# Patient Record
Sex: MALE | Race: WHITE | Employment: OTHER | URBAN - METROPOLITAN AREA
[De-identification: names, ages, dates, MRNs, and addresses within clinical notes are randomized per-mention and may not be internally consistent; named-entity substitution may affect disease eponyms.]

---

## 2022-09-18 ENCOUNTER — APPOINTMENT (OUTPATIENT)
Dept: MRI IMAGING | Age: 72
End: 2022-09-18
Payer: COMMERCIAL

## 2022-09-18 ENCOUNTER — HOSPITAL ENCOUNTER (EMERGENCY)
Age: 72
Discharge: LEFT BEFORE DISCHARGE | End: 2022-09-18
Attending: EMERGENCY MEDICINE
Payer: COMMERCIAL

## 2022-09-18 ENCOUNTER — APPOINTMENT (OUTPATIENT)
Dept: CT IMAGING | Age: 72
End: 2022-09-18
Payer: COMMERCIAL

## 2022-09-18 ENCOUNTER — APPOINTMENT (OUTPATIENT)
Dept: GENERAL RADIOLOGY | Age: 72
End: 2022-09-18
Payer: COMMERCIAL

## 2022-09-18 VITALS
SYSTOLIC BLOOD PRESSURE: 136 MMHG | HEART RATE: 73 BPM | OXYGEN SATURATION: 97 % | DIASTOLIC BLOOD PRESSURE: 67 MMHG | TEMPERATURE: 97.8 F | RESPIRATION RATE: 12 BRPM | HEIGHT: 66 IN | BODY MASS INDEX: 29.73 KG/M2 | WEIGHT: 185 LBS

## 2022-09-18 DIAGNOSIS — Z53.29 LEFT AGAINST MEDICAL ADVICE: ICD-10-CM

## 2022-09-18 DIAGNOSIS — I70.90 ATHEROSCLEROSIS: ICD-10-CM

## 2022-09-18 DIAGNOSIS — R42 DIZZINESS: Primary | ICD-10-CM

## 2022-09-18 LAB
A/G RATIO: 1.6 (ref 1.1–2.2)
ALBUMIN SERPL-MCNC: 4.2 G/DL (ref 3.4–5)
ALP BLD-CCNC: 62 U/L (ref 40–129)
ALT SERPL-CCNC: 14 U/L (ref 10–40)
ANION GAP SERPL CALCULATED.3IONS-SCNC: 9 MMOL/L (ref 3–16)
AST SERPL-CCNC: 26 U/L (ref 15–37)
BASOPHILS ABSOLUTE: 0.1 K/UL (ref 0–0.2)
BASOPHILS RELATIVE PERCENT: 1.2 %
BILIRUB SERPL-MCNC: <0.2 MG/DL (ref 0–1)
BILIRUBIN URINE: NEGATIVE
BLOOD, URINE: ABNORMAL
BUN BLDV-MCNC: 20 MG/DL (ref 7–20)
CALCIUM SERPL-MCNC: 8.6 MG/DL (ref 8.3–10.6)
CHLORIDE BLD-SCNC: 104 MMOL/L (ref 99–110)
CHP ED QC CHECK: YES
CLARITY: CLEAR
CO2: 22 MMOL/L (ref 21–32)
COLOR: YELLOW
CREAT SERPL-MCNC: 1 MG/DL (ref 0.8–1.3)
EKG ATRIAL RATE: 66 BPM
EKG DIAGNOSIS: NORMAL
EKG P AXIS: 71 DEGREES
EKG P-R INTERVAL: 164 MS
EKG Q-T INTERVAL: 402 MS
EKG QRS DURATION: 82 MS
EKG QTC CALCULATION (BAZETT): 421 MS
EKG R AXIS: 63 DEGREES
EKG T AXIS: 42 DEGREES
EKG VENTRICULAR RATE: 66 BPM
EOSINOPHILS ABSOLUTE: 0.4 K/UL (ref 0–0.6)
EOSINOPHILS RELATIVE PERCENT: 4.8 %
GFR AFRICAN AMERICAN: >60
GFR NON-AFRICAN AMERICAN: >60
GLUCOSE BLD-MCNC: 125 MG/DL
GLUCOSE BLD-MCNC: 125 MG/DL (ref 70–99)
GLUCOSE BLD-MCNC: 134 MG/DL (ref 70–99)
GLUCOSE URINE: NEGATIVE MG/DL
HCT VFR BLD CALC: 46.5 % (ref 40.5–52.5)
HEMOGLOBIN: 15.4 G/DL (ref 13.5–17.5)
INR BLD: 0.9 (ref 0.87–1.14)
KETONES, URINE: NEGATIVE MG/DL
LEUKOCYTE ESTERASE, URINE: NEGATIVE
LYMPHOCYTES ABSOLUTE: 2.4 K/UL (ref 1–5.1)
LYMPHOCYTES RELATIVE PERCENT: 29.7 %
MAGNESIUM: 1.9 MG/DL (ref 1.8–2.4)
MCH RBC QN AUTO: 32.1 PG (ref 26–34)
MCHC RBC AUTO-ENTMCNC: 33.3 G/DL (ref 31–36)
MCV RBC AUTO: 96.6 FL (ref 80–100)
MICROSCOPIC EXAMINATION: YES
MONOCYTES ABSOLUTE: 0.7 K/UL (ref 0–1.3)
MONOCYTES RELATIVE PERCENT: 9 %
NEUTROPHILS ABSOLUTE: 4.4 K/UL (ref 1.7–7.7)
NEUTROPHILS RELATIVE PERCENT: 55.3 %
NITRITE, URINE: NEGATIVE
PDW BLD-RTO: 14.3 % (ref 12.4–15.4)
PERFORMED ON: ABNORMAL
PH UA: 6.5 (ref 5–8)
PLATELET # BLD: 178 K/UL (ref 135–450)
PLATELET SLIDE REVIEW: ADEQUATE
PMV BLD AUTO: 9.7 FL (ref 5–10.5)
POTASSIUM SERPL-SCNC: 5 MMOL/L (ref 3.5–5.1)
PROTEIN UA: NEGATIVE MG/DL
PROTHROMBIN TIME: 11.9 SEC (ref 11.7–14.5)
RBC # BLD: 4.81 M/UL (ref 4.2–5.9)
RBC # BLD: NORMAL 10*6/UL
RBC UA: NORMAL /HPF (ref 0–4)
SLIDE REVIEW: NORMAL
SODIUM BLD-SCNC: 135 MMOL/L (ref 136–145)
SPECIFIC GRAVITY UA: 1.01 (ref 1–1.03)
TOTAL PROTEIN: 6.9 G/DL (ref 6.4–8.2)
TROPONIN: <0.01 NG/ML
URINE REFLEX TO CULTURE: ABNORMAL
URINE TYPE: ABNORMAL
UROBILINOGEN, URINE: 0.2 E.U./DL
WBC # BLD: 8 K/UL (ref 4–11)
WBC UA: NORMAL /HPF (ref 0–5)

## 2022-09-18 PROCEDURE — 70551 MRI BRAIN STEM W/O DYE: CPT

## 2022-09-18 PROCEDURE — 71045 X-RAY EXAM CHEST 1 VIEW: CPT

## 2022-09-18 PROCEDURE — 70450 CT HEAD/BRAIN W/O DYE: CPT

## 2022-09-18 PROCEDURE — 6360000004 HC RX CONTRAST MEDICATION: Performed by: EMERGENCY MEDICINE

## 2022-09-18 PROCEDURE — 6370000000 HC RX 637 (ALT 250 FOR IP): Performed by: EMERGENCY MEDICINE

## 2022-09-18 PROCEDURE — 83735 ASSAY OF MAGNESIUM: CPT

## 2022-09-18 PROCEDURE — 6360000002 HC RX W HCPCS: Performed by: EMERGENCY MEDICINE

## 2022-09-18 PROCEDURE — 84484 ASSAY OF TROPONIN QUANT: CPT

## 2022-09-18 PROCEDURE — 93005 ELECTROCARDIOGRAM TRACING: CPT | Performed by: EMERGENCY MEDICINE

## 2022-09-18 PROCEDURE — 81001 URINALYSIS AUTO W/SCOPE: CPT

## 2022-09-18 PROCEDURE — 80053 COMPREHEN METABOLIC PANEL: CPT

## 2022-09-18 PROCEDURE — 93010 ELECTROCARDIOGRAM REPORT: CPT | Performed by: INTERNAL MEDICINE

## 2022-09-18 PROCEDURE — 70498 CT ANGIOGRAPHY NECK: CPT

## 2022-09-18 PROCEDURE — 96374 THER/PROPH/DIAG INJ IV PUSH: CPT

## 2022-09-18 PROCEDURE — 85610 PROTHROMBIN TIME: CPT

## 2022-09-18 PROCEDURE — 99285 EMERGENCY DEPT VISIT HI MDM: CPT

## 2022-09-18 PROCEDURE — 85025 COMPLETE CBC W/AUTO DIFF WBC: CPT

## 2022-09-18 PROCEDURE — 2580000003 HC RX 258: Performed by: EMERGENCY MEDICINE

## 2022-09-18 RX ORDER — ASPIRIN 81 MG/1
81 TABLET, CHEWABLE ORAL DAILY
Qty: 30 TABLET | Refills: 0 | Status: SHIPPED | OUTPATIENT
Start: 2022-09-18 | End: 2022-10-18

## 2022-09-18 RX ORDER — ONDANSETRON 2 MG/ML
4 INJECTION INTRAMUSCULAR; INTRAVENOUS ONCE
Status: COMPLETED | OUTPATIENT
Start: 2022-09-18 | End: 2022-09-18

## 2022-09-18 RX ORDER — 0.9 % SODIUM CHLORIDE 0.9 %
500 INTRAVENOUS SOLUTION INTRAVENOUS ONCE
Status: COMPLETED | OUTPATIENT
Start: 2022-09-18 | End: 2022-09-18

## 2022-09-18 RX ORDER — DIAZEPAM 2 MG/1
2 TABLET ORAL EVERY 12 HOURS PRN
Qty: 5 TABLET | Refills: 0 | Status: SHIPPED | OUTPATIENT
Start: 2022-09-18 | End: 2022-09-28

## 2022-09-18 RX ORDER — DIAZEPAM 5 MG/1
5 TABLET ORAL ONCE
Status: COMPLETED | OUTPATIENT
Start: 2022-09-18 | End: 2022-09-18

## 2022-09-18 RX ORDER — MECLIZINE HCL 12.5 MG/1
25 TABLET ORAL ONCE
Status: COMPLETED | OUTPATIENT
Start: 2022-09-18 | End: 2022-09-18

## 2022-09-18 RX ORDER — MECLIZINE HCL 12.5 MG/1
12.5 TABLET ORAL 3 TIMES DAILY PRN
Qty: 15 TABLET | Refills: 0 | Status: SHIPPED | OUTPATIENT
Start: 2022-09-18 | End: 2022-09-28

## 2022-09-18 RX ORDER — ONDANSETRON 4 MG/1
4 TABLET, ORALLY DISINTEGRATING ORAL EVERY 8 HOURS PRN
Qty: 20 TABLET | Refills: 0 | Status: SHIPPED | OUTPATIENT
Start: 2022-09-18

## 2022-09-18 RX ADMIN — SODIUM CHLORIDE 500 ML: 9 INJECTION, SOLUTION INTRAVENOUS at 09:23

## 2022-09-18 RX ADMIN — ONDANSETRON 4 MG: 2 INJECTION INTRAMUSCULAR; INTRAVENOUS at 09:18

## 2022-09-18 RX ADMIN — MECLIZINE 25 MG: 12.5 TABLET ORAL at 10:11

## 2022-09-18 RX ADMIN — IOPAMIDOL 75 ML: 755 INJECTION, SOLUTION INTRAVENOUS at 08:56

## 2022-09-18 RX ADMIN — DIAZEPAM 5 MG: 5 TABLET ORAL at 10:42

## 2022-09-18 ASSESSMENT — ENCOUNTER SYMPTOMS
SINUS PAIN: 0
VOMITING: 0
COLOR CHANGE: 0
SORE THROAT: 0
SHORTNESS OF BREATH: 0
TROUBLE SWALLOWING: 0
EYE PAIN: 0
CHEST TIGHTNESS: 0
NAUSEA: 1
ABDOMINAL PAIN: 0
PHOTOPHOBIA: 0
EYE REDNESS: 0

## 2022-09-18 ASSESSMENT — PAIN - FUNCTIONAL ASSESSMENT: PAIN_FUNCTIONAL_ASSESSMENT: NONE - DENIES PAIN

## 2022-09-18 NOTE — CONSULTS
Telemedicine Consult Note   Stroke Team                Patient Name: Diane Solo (52 y.o. male)  MRN: [id number]  : 1950  Admission Date: 2022   Current Date: 22    STROKE TIMELINE  Last Known to be Well (Stroke Diagnosis)  Date Last Known Well: 22  Time Last Known Well: 0100  ED arrival time: ED Arrival 0829    TENECTEPLASE (TNKase) bolus time: None  DTN (minutes): NA    Groin puncture time: NA  ED arrival to groin puncture time: NA      Chief Complaint     Chief Complaint   Patient presents with    Dizziness     Patient states \"everything is spinning\" since I woke up this AM around 0800. LNW 1am this morning. Patient states \"whooshing noise\" in right ear. Acute onset of vertigo and unsteadiness. History of Present Illness   This is a 67 y.o., male, who has no prior symptoms of vertigo or hearing problems presented with acute onset of vertigo and unsteadiness. He has had recurrent R ear pulsatile tinnitus. Patient's last known normal was 1 AM. . Past Medical History:   Diagnosis Date    Hypertension       No past surgical history on file.   Social History     Socioeconomic History    Marital status:      Spouse name: Not on file    Number of children: Not on file    Years of education: Not on file    Highest education level: Not on file   Occupational History    Not on file   Tobacco Use    Smoking status: Every Day     Packs/day: 1.00     Types: Cigarettes    Smokeless tobacco: Never   Vaping Use    Vaping Use: Never used   Substance and Sexual Activity    Alcohol use: Yes     Comment: couple times per month    Drug use: Never    Sexual activity: Not on file   Other Topics Concern    Not on file   Social History Narrative    Not on file     Social Determinants of Health     Financial Resource Strain: Not on file   Food Insecurity: Not on file   Transportation Needs: Not on file   Physical Activity: Not on file   Stress: Not on file   Social Connections: Not on file   Intimate Partner Violence: Not on file   Housing Stability: Not on file     No family history on file. No current facility-administered medications on file prior to encounter. Current Outpatient Medications on File Prior to Encounter   Medication Sig Dispense Refill    aspirin 325 MG tablet Take 325 mg by mouth daily. Review of Systems         Physical Exam     Vitals:    22 0955   BP: (!) 146/73   Pulse: 63   Resp: 12   Temp:    SpO2: 100%              NIH Stroke Scale    Time Performed: 10:14 AM        1a  Level of consciousness: 0 - alert; keenly responsive   1b. LOC questions:  0 - answers both questions correctly   1c. LOC commands: 0=Performs both tasks correctly   2. Best Gaze: 0=normal   3. Visual: 0=No visual loss   4. Facial Palsy: 0=Normal symmetric movement   5a. Motor left arm: 0=No drift, limb holds 90 (or 45) degrees for full 10 seconds   5b. Motor right arm: 0=No drift, limb holds 90 (or 45) degrees for full 10 seconds   6a. motor left le=No drift, limb holds 90 (or 45) degrees for full 10 seconds   6b  Motor right le=No drift, limb holds 90 (or 45) degrees for full 10 seconds   7. Limb Ataxia: 0=Absent   8. Sensory: 0=Normal; no sensory loss   9. Best Language:  0 - no aphasia, normal   10. Dysarthria: 0=Normal   11. Extinction and Inattention: 0=No abnormality         Total:   0     Other exam findings of note: nystagmus per ED doc hard to see on video        Labs:  CBC:   Recent Labs     22  0845   WBC 8.0   HGB 15.4        BMP:    Recent Labs     22  0845   *   K 5.0      CO2 22   BUN 20   CREATININE 1.0   GLUCOSE 134*     Ca/Mg/Phos:   Recent Labs     22  0845   CALCIUM 8.6   MG 1.90     Troponin:   Recent Labs     22  0845   TROPONINI <0.01     BNP: No results for input(s): BNP in the last 72 hours. Lipids: No results for input(s): CHOL, TRIG, HDL, LDLCALC, LABVLDL in the last 72 hours.   ABGs: No results for input(s): PHART, PO2ART, NYQ3GDQ in the last 72 hours. PT/INR:   Recent Labs     09/18/22  0845   PROTIME 11.9   INR 0.90     APTT: No results for input(s): APTT in the last 72 hours. HgA1C: Invalid input(s): HGBA1C    Radiology (my read): Normal CT and mastoid cells aerated but couldn't see L SCA and smaller L vertebral ends in PICA. CT HEAD WO CONTRAST    Result Date: 9/18/2022  No acute intracranial abnormality. Findings were discussed with Jaylyn Laureano at 8:57 am on 9/18/2022. CTA HEAD NECK W CONTRAST    Result Date: 9/18/2022  1. No large vessel occlusion, significant stenosis or cerebral aneurysm identified within the brain. 2. Mild atherosclerosis without significant arterial stenosis identified within the neck. MRI of brain:   no acute infarcts on diffusion. Mild white matter changes on flair in posterior white matter. Assessment   1) Acute vertigo that is likely end organ in origin without evidence of ischemic change on MRI at this point. No history of Meniere's. Could have viral vestibular syndrome as well. Not BPV. I wonder about pulsatile tinnitus - ? Dural fistula but nothing clearly seen on CTA. Recommendations/Plan   1) Admit to hospital for symptomatic treatment and then when steadier evaluate for stability before going home. Monitor for any changes. Tenecteplase given: No  Not a stroke at this point. If DTN >60 minutes, reason: NA    EVT:No  Transfer: No     Patient enrolled in clinical trials: No  Clinical trial   Please do not change any trial related orders without speaking with the research coordinator first.  The number to contact is [phone]. If tenecteplase given, keep blood pressure <180/105; otherwise permissive hypertension. If tenecteplase given, do NOT give antiplatelets or anticoagulants until 24 hours and a safety scan has been completed.      Additional Recommendations:   -NPO until passing bedside dysphagia screen or formal swallow  -MRI/CT at 24 hours as a safety scan  -Local Neurology consult  -PT/OT/ST     For a change in neuro exam or questions, call the  Stroke Team at [phone].           Electronically signed by Renetta Richter MD on 9/18/22 at 10:14 AM EDT    Stroke Team        Telemedicine Time: 30   minutes

## 2022-09-18 NOTE — ED NOTES
Patient reports no dizziness after receiving valium. Alert and oriented resting in bed.       Lesly Yun RN  09/18/22 5014

## 2022-09-18 NOTE — DISCHARGE INSTRUCTIONS
Use meclizine as needed for dizziness. Stay hydrated. Take Valium for breakthrough dizziness but do not drive with this and understand it can be addictive. Take Zofran for nausea. Take a baby aspirin daily until told otherwise by your primary doctor. Return to the emergency department over the next 12 to 24 hours for any worsening dizziness with inability to walk, severe headache, new onset chest pain with shortness of breath, or any other concerns. We did recommend admission today but at this time you are declining. Make sure to follow-up with your primary doctor and that within the next 1 to 3 days for repeat evaluation to ensure you are still doing well.

## 2022-09-18 NOTE — ED NOTES
Code Stroke   @0839  Called Code Stroke  @9304 Called  Stroke Team  @3703 Called CT  @6177 CT ready for PT  @0840 Called Lab   @0381 Ember Nesbitt called back from [facility] Stroke Team and spoke to [address] [place] [address]

## 2022-09-18 NOTE — ED NOTES
Patient ambulated in hallway with steady gate. Reported no dizziness and that he would like to be discharged.       Mary Kate Cheek RN  09/18/22 4304

## 2022-09-18 NOTE — ED NOTES
Patient ambulated with supervised assistance by RN from Room 10 to bathroom across from Room 9 and back. Patient reporting continued dizziness, worse when moving head from side to side. Patient gait unsteady during ambulation. Dr. Aristeo Suero observed portions of the ambulation and also updated post ambulation.       Jennifer Rosado RN  09/18/22 7930

## 2022-09-18 NOTE — ED NOTES
Patient back from MRI.  Stroke Team contacted and will TeleStroke with patient.       Daniela Alfaro RN  09/18/22 5546

## 2022-09-18 NOTE — ED NOTES
RN spoke with  Stroke Team and they requested that patient have MRI prior to speaking with them. Stroke physician also okayed holding meclizine at this time prior to assessment. MRI questionnaire completed; MRI contacted, inpatient coming to department and will call for ER patient as soon as completed.       Garrick Brownlee RN  09/18/22 9245

## 2023-01-01 NOTE — ED PROVIDER NOTES
[facility]  ED  EMERGENCY DEPARTMENT ENCOUNTER        Pt Name: Marcella Thompson  MRN: [id number]  [place] 1950  Date of evaluation: 9/18/2022  Provider: Micaela Roper MD  PCP: Ric Moyer MD      [address] [place] [address]       Chief Complaint   Patient presents with    Dizziness     Patient states \"everything is spinning\" since I woke up this AM around 0800. LNW 1am this morning. Patient states \"whooshing noise\" in right ear. HISTORY OFPRESENT ILLNESS   (Location/Symptom, Timing/Onset, Context/Setting, Quality, Duration, Modifying Factors,Severity)  Note limiting factors. Marcella Thompson is a 67 y.o. male presenting today due to concern for waking up around 8 AM this morning with obvious dizziness with the association that the room is spinning. His wife had to help him significantly to get from the bed to the car to come to the emergency department and he was unable to walk on his own. He does report an intermittent whooshing sound in his right ear. He reports some neck discomfort but that is a chronic issue. He denies any recent neck manipulations. He denies any chest pain or shortness of breath. He denies any numbness or weakness in the arms or legs. No visual concerns other than that the room is spinning. No trouble swallowing. No trouble speaking. He is nauseated but denies any vomiting. He felt fine when he went to bed at 1 AM last night and did not wake up throughout the night from what he can remember and the moment he woke up this morning is when he noticed the vertigo. He denies having a history of symptoms like this in the past.  He denies any headache. He did take some aspirin this morning prior to coming to the emergency department but denies being on any other blood thinners. He does not take anything for blood pressure normally.         REVIEW OF SYSTEMS    (2-9 systems for level 4, 10 or more for level 5)     Review of Systems   Constitutional: Negative for chills, diaphoresis, fatigue and fever. HENT:  Negative for dental problem, ear pain, hearing loss (whooshing sound in right ear), sinus pain, sore throat and trouble swallowing. Eyes:  Positive for visual disturbance (related to room spinning only). Negative for photophobia, pain and redness. Respiratory:  Negative for chest tightness and shortness of breath. Cardiovascular:  Negative for chest pain. Gastrointestinal:  Positive for nausea. Negative for abdominal pain and vomiting. Genitourinary:  Negative for flank pain. Musculoskeletal:  Positive for gait problem and neck pain. Negative for neck stiffness. Skin:  Negative for color change and wound. Neurological:  Positive for dizziness. Negative for seizures, syncope, speech difficulty, weakness, light-headedness, numbness and headaches. Hematological:  Does not bruise/bleed easily. Psychiatric/Behavioral:  Negative for confusion. The patient is nervous/anxious. Positives and Pertinent negatives as per HPI. PASTMEDICAL HISTORY     Past Medical History:   Diagnosis Date    Hypertension          SURGICAL HISTORY     No past surgical history on file. CURRENT MEDICATIONS       Discharge Medication List as of 9/18/2022 12:47 PM        CONTINUE these medications which have NOT CHANGED    Details   aspirin 325 MG tablet Take 325 mg by mouth daily. ALLERGIES     Patient has no known allergies. FAMILY HISTORY     No family history on file.        SOCIAL HISTORY       Social History     Socioeconomic History    Marital status:    Tobacco Use    Smoking status: Every Day     Packs/day: 1.00     Types: Cigarettes    Smokeless tobacco: Never   Vaping Use    Vaping Use: Never used   Substance and Sexual Activity    Alcohol use: Yes     Comment: couple times per month    Drug use: Never       SCREENINGS   NIH Stroke Scale  Interval: Reassessment  Level of Consciousness (1a): Alert  LOC Questions (1b): Answers both correctly  LOC Commands (1c): Performs both tasks correctly  Best Gaze (2): Normal  Visual (3): No visual loss  Facial Palsy (4): Normal symmetrical movement  Motor Arm, Left (5a): No drift  Motor Arm, Right (5b): No drift  Motor Leg, Left (6a): No drift  Motor Leg, Right (6b): No drift  Limb Ataxia (7): Absent  Sensory (8): Normal  Best Language (9): No aphasia  Dysarthria (10): Normal  Extinction and Inattention (11): No abnormality  Total: 0Glasgow Coma Scale  Eye Opening: Spontaneous  Best Verbal Response: Oriented  Best Motor Response: Obeys commands  [place] Coma Scale Score: 15           PHYSICAL EXAM    (up to 7 for level 4, 8 or more for level 5)     ED Triage Vitals   BP Temp Temp src Pulse Resp SpO2 Height Weight   -- -- -- -- -- -- -- --       Physical Exam  Vitals and nursing note reviewed. Constitutional:       General: He is not in acute distress. Appearance: Normal appearance. He is well-developed. He is obese. He is not ill-appearing, toxic-appearing or diaphoretic. HENT:      Head: Normocephalic and atraumatic. Right Ear: Tympanic membrane, ear canal and external ear normal. There is no impacted cerumen. Left Ear: Tympanic membrane, ear canal and external ear normal. There is no impacted cerumen. Nose: Nose normal. No congestion. Mouth/Throat:      Mouth: Mucous membranes are moist.      Pharynx: Oropharynx is clear. No oropharyngeal exudate or posterior oropharyngeal erythema. Eyes:      General: Lids are normal. No visual field deficit or scleral icterus. Right eye: No discharge. Left eye: No discharge. Extraocular Movements: Extraocular movements intact. Right eye: Nystagmus present. Normal extraocular motion. Left eye: Nystagmus present. Normal extraocular motion. Conjunctiva/sclera: Conjunctivae normal.      Pupils: Pupils are equal, round, and reactive to light.  Pupils are equal.      Right eye: Pupil is round, facial asymmetry. Sensory: Sensation is intact. No sensory deficit. Motor: Motor function is intact. No weakness, tremor, atrophy, abnormal muscle tone, seizure activity or pronator drift. Coordination: Coordination normal. Finger-Nose-Finger Test and Heel to [facility] Test normal.      Gait: Gait abnormal.      Comments: NIHSS = 0 at 0835 but concerning for possible stroke due to nystagmus and trouble walking   Positive bilateral nystagmus horizontally and also appears to have some nystagmus vertically  Unable to ambulate initially due to significant dizziness   Psychiatric:         Attention and Perception: Attention normal.         Mood and Affect: Mood and affect normal. Mood is not anxious. Speech: Speech normal. Speech is not delayed or slurred. Behavior: Behavior normal. Behavior is cooperative. DIAGNOSTIC RESULTS   :    Labs Reviewed   COMPREHENSIVE METABOLIC PANEL - Abnormal; Notable for the following components:       Result Value    Sodium 135 (*)     Glucose 134 (*)     All other components within normal limits   URINALYSIS WITH REFLEX TO CULTURE - Abnormal; Notable for the following components:    Blood, Urine TRACE-INTACT (*)     All other components within normal limits   POCT GLUCOSE - Abnormal; Notable for the following components:    POC Glucose 125 (*)     All other components within normal limits   POCT GLUCOSE - Normal   CBC WITH AUTO DIFFERENTIAL   MAGNESIUM   PROTIME-INR   TROPONIN   MICROSCOPIC URINALYSIS       All other labs were within normal range or not returned asof this dictation. EKG: All EKG's are interpreted by the Emergency Department Physician who either signs or Co-signs this chart in the absence of a cardiologist.    The Ekg interpreted by me shows  normal sinus rhythm with a rate of 66  Axis is   Normal  QTc is  normal  Intervals and Durations are unremarkable.       ST Segments: nonspecific changes  No significant change from prior EKG dated - no old EKG  No STEMI         RADIOLOGY:   Non-plain film images such as CT, Ultrasound and MRI are read by the radiologist. Rosa Brody images are visualized and preliminarily interpreted by the  ED Provider with the belowfindings:        Interpretation per the Radiologist below, if available at the time of this note:    MRI BRAIN WO CONTRAST   Final Result   No acute infarct or acute intracranial process identified. Mild chronic small vessel ischemic changes. XR CHEST PORTABLE   Final Result   No acute process. CTA HEAD NECK W CONTRAST   Final Result   1. No large vessel occlusion, significant stenosis or cerebral aneurysm   identified within the brain. 2. Mild atherosclerosis without significant arterial stenosis identified   within the neck. CT HEAD WO CONTRAST   Final Result   No acute intracranial abnormality. Findings were discussed with Margaret Gambino at 8:57 am on 9/18/2022. PROCEDURES   Unless otherwise noted below, none     Procedures    CRITICAL CARE TIME   Critical Care Time:    I personally saw the patient and independently provided 35 minutes of non-concurrent critical care out of a total shared critical care time provided. Includes repeat examinations, speaking with consultants, lab  interpretation, charting, assessing for acute stroke   Excludes separate billable procedures. Patient at risk for serious decompensation if not treated for this life-threatening condition. CONSULTS: Spoke with Dr. Eric Umanzor and Willow Grimes with stroke team at [phone] and they do agree with code stroke but at this time I am not recommending calling in MRI team for possible wake-up stroke protocol until they look at the CT and CTA of the head and neck to further determine the next step.   Spoke with stroke team again and they reported no significant findings on CTA but did want to do a telestroke evaluation and then recommended wake-up brain MRI protocol. Spoke to stroke team again after MRI was completed with no concern for ischemic stroke and they recommended against TNK at that point but still recommended admission for gait assessment to ensure no increased risk for fall. IP CONSULT TO STROKE TEAM    EMERGENCY DEPARTMENT COURSE and DIFFERENTIAL DIAGNOSIS/MDM:   Vitals:    Vitals:    09/18/22 0902 09/18/22 0955 09/18/22 1025 09/18/22 1150   BP: (!) 144/73 (!) 146/73 (!) 141/73 136/67   Pulse: 67 63 63 73   Resp: 11 12 13 12   Temp:    97.8 °F (36.6 °C)   TempSrc:    Oral   SpO2: 99% 100% 99% 97%   Weight:       Height:           Patient was given the following medications:  Medications   ondansetron (ZOFRAN) injection 4 mg (4 mg IntraVENous Given 9/18/22 0918)   0.9 % sodium chloride bolus (0 mLs IntraVENous Stopped 9/18/22 1035)   meclizine (ANTIVERT) tablet 25 mg (25 mg Oral Given 9/18/22 1011)   iopamidol (ISOVUE-370) 76 % injection 75 mL (75 mLs IntraVENous Given 9/18/22 0856)   diazePAM (VALIUM) tablet 5 mg (5 mg Oral Given 9/18/22 1042)     Patient was evaluated a concern for sudden onset dizziness upon awakening this morning that he noticed the moment he woke up with last well-known being 1 AM.  Code stroke was called after my initial evaluation. He received meclizine for the dizziness along with Zofran for nausea. Ultimately, he was still having trouble walking in the emergency department and therefore stroke team did evaluate him via telestroke and recommended brain MRI emergently. This was completed show no concern for acute stroke and therefore no need for TNK per stroke team.  He had minimal improvement with the meclizine but after the Valium was given was feeling much better. He was able to ambulate on his own without any difficulty at that point other than appearing slightly unsteady but needing no assistance with walking at that time. His nystagmus also resolved when I reevaluated him.   Since stroke team did recommend admission for further assessment, I did highly recommend this to the patient as well but he declined. He had full mental capacity to make his own medical decisions and is aware if this was related to a TIA or other significant issue such as hypertensive urgency, the next time it happens could lead to a stroke that could cause severe disability or death. He was comfortable excepting this risk and wanted to go home and therefore ultimately signed out [address] He was told to take a baby aspirin daily and try meclizine for dizziness and Valium for breakthrough dizziness but do not drive with Valium and understand it can be addictive and do not mix it with alcohol. He was told to call his primary doctor first thing tomorrow for urgent evaluation for further evaluation as to why he was dizzy today and ultimately he may need to be referred by his primary doctor to ENT since he does report having intermittent whooshing sound in his right ear. He was well-appearing and in no acute distress upon repeat evaluation prior to leaving the emergency department but did sign out [address] after I had an informed discussion with him and his wife. They were both comfortable with this plan. The patient tolerated their visit well. The patient and / or the family were informed of the results of any tests, a time was given to answer questions. FINAL IMPRESSION      1. Dizziness    2. Atherosclerosis    3.  Left against medical advice          DISPOSITION/PLAN   DISPOSITION [place] 09/18/2022 12:36:18 PM-Decision to admit      PATIENT REFERRED TO:  [facility]  ED  [address] [facility]  Go to   If symptoms worsen    MD CARLEEN Beckford/ Anya [address] [facility]  [phone]    Schedule an appointment as soon as possible for a visit in 2 days      DISCHARGEMEDICATIONS:  Discharge Medication List as of 9/18/2022 12:47 PM        START taking these medications    Details   ondansetron (ZOFRAN ODT) 4 MG disintegrating tablet Take 1 tablet by mouth every 8 hours as needed for Nausea, Disp-20 tablet, R-0Print      meclizine (ANTIVERT) 12.5 MG tablet Take 1 tablet by mouth 3 times daily as needed for Dizziness or Nausea, Disp-15 tablet, R-0Print      diazePAM (VALIUM) 2 MG tablet Take 1 tablet by mouth every 12 hours as needed for Anxiety (breakthrough dizziness if meclizine not helping, do not drive with this) for up to 10 days. , Disp-5 tablet, R-0Print      aspirin (ASPIRIN CHILDRENS) 81 MG chewable tablet Take 1 tablet by mouth daily, Disp-30 tablet, R-0Print             DISCONTINUED MEDICATIONS:  Discharge Medication List as of 9/18/2022 12:47 PM                 (Please note that portions of this note were completed with a voicerecognition program.  Efforts were made to edit the dictations but occasionally words are mis-transcribed.)    Josh Willson MD (electronically signed)            Josh Willson MD  09/18/22 6315 [place]